# Patient Record
Sex: FEMALE | Race: WHITE | NOT HISPANIC OR LATINO | URBAN - METROPOLITAN AREA
[De-identification: names, ages, dates, MRNs, and addresses within clinical notes are randomized per-mention and may not be internally consistent; named-entity substitution may affect disease eponyms.]

---
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
## 2025-05-15 ENCOUNTER — TELEPHONE (OUTPATIENT)
Dept: NURSING | Facility: CLINIC | Age: 20
End: 2025-05-15
Payer: COMMERCIAL

## 2025-05-15 ENCOUNTER — HOSPITAL ENCOUNTER (EMERGENCY)
Facility: HOSPITAL | Age: 20
Discharge: HOME | End: 2025-05-15
Attending: STUDENT IN AN ORGANIZED HEALTH CARE EDUCATION/TRAINING PROGRAM
Payer: COMMERCIAL

## 2025-05-15 ENCOUNTER — NURSE TRIAGE (OUTPATIENT)
Dept: NURSING | Facility: CLINIC | Age: 20
End: 2025-05-15
Payer: COMMERCIAL

## 2025-05-15 VITALS
SYSTOLIC BLOOD PRESSURE: 99 MMHG | BODY MASS INDEX: 21.39 KG/M2 | OXYGEN SATURATION: 99 % | DIASTOLIC BLOOD PRESSURE: 65 MMHG | WEIGHT: 128.4 LBS | HEART RATE: 89 BPM | RESPIRATION RATE: 20 BRPM | HEIGHT: 65 IN | TEMPERATURE: 98.5 F

## 2025-05-15 DIAGNOSIS — K52.9 ACUTE GASTROENTERITIS: ICD-10-CM

## 2025-05-15 LAB
ALBUMIN SERPL BCG-MCNC: 4.3 G/DL (ref 3.5–5.2)
ALP SERPL-CCNC: 54 U/L (ref 40–150)
ALT SERPL W P-5'-P-CCNC: 12 U/L (ref 0–50)
ANION GAP SERPL CALCULATED.3IONS-SCNC: 7 MMOL/L (ref 7–15)
AST SERPL W P-5'-P-CCNC: 14 U/L (ref 0–45)
BASOPHILS # BLD AUTO: 0 10E3/UL (ref 0–0.2)
BASOPHILS NFR BLD AUTO: 0 %
BILIRUB DIRECT SERPL-MCNC: 0.12 MG/DL (ref 0–0.3)
BILIRUB SERPL-MCNC: 0.3 MG/DL
BUN SERPL-MCNC: 15.3 MG/DL (ref 6–20)
CALCIUM SERPL-MCNC: 8.7 MG/DL (ref 8.8–10.4)
CHLORIDE SERPL-SCNC: 106 MMOL/L (ref 98–107)
CREAT SERPL-MCNC: 0.7 MG/DL (ref 0.51–0.95)
EGFRCR SERPLBLD CKD-EPI 2021: >90 ML/MIN/1.73M2
EOSINOPHIL # BLD AUTO: 0 10E3/UL (ref 0–0.7)
EOSINOPHIL NFR BLD AUTO: 0 %
ERYTHROCYTE [DISTWIDTH] IN BLOOD BY AUTOMATED COUNT: 12 % (ref 10–15)
GLUCOSE SERPL-MCNC: 129 MG/DL (ref 70–99)
HCG UR QL: NEGATIVE
HCO3 SERPL-SCNC: 26 MMOL/L (ref 22–29)
HCT VFR BLD AUTO: 42 % (ref 35–47)
HGB BLD-MCNC: 14.2 G/DL (ref 11.7–15.7)
IMM GRANULOCYTES # BLD: 0 10E3/UL
IMM GRANULOCYTES NFR BLD: 0 %
LIPASE SERPL-CCNC: 34 U/L (ref 13–60)
LYMPHOCYTES # BLD AUTO: 1.2 10E3/UL (ref 0.8–5.3)
LYMPHOCYTES NFR BLD AUTO: 11 %
MCH RBC QN AUTO: 31.1 PG (ref 26.5–33)
MCHC RBC AUTO-ENTMCNC: 33.8 G/DL (ref 31.5–36.5)
MCV RBC AUTO: 92 FL (ref 78–100)
MONOCYTES # BLD AUTO: 0.6 10E3/UL (ref 0–1.3)
MONOCYTES NFR BLD AUTO: 5 %
NEUTROPHILS # BLD AUTO: 8.8 10E3/UL (ref 1.6–8.3)
NEUTROPHILS NFR BLD AUTO: 83 %
NRBC # BLD AUTO: 0 10E3/UL
NRBC BLD AUTO-RTO: 0 /100
PLATELET # BLD AUTO: 170 10E3/UL (ref 150–450)
POTASSIUM SERPL-SCNC: 3.6 MMOL/L (ref 3.4–5.3)
PROT SERPL-MCNC: 7 G/DL (ref 6.4–8.3)
RBC # BLD AUTO: 4.56 10E6/UL (ref 3.8–5.2)
SODIUM SERPL-SCNC: 139 MMOL/L (ref 135–145)
WBC # BLD AUTO: 10.6 10E3/UL (ref 4–11)

## 2025-05-15 PROCEDURE — 82248 BILIRUBIN DIRECT: CPT | Performed by: STUDENT IN AN ORGANIZED HEALTH CARE EDUCATION/TRAINING PROGRAM

## 2025-05-15 PROCEDURE — 85025 COMPLETE CBC W/AUTO DIFF WBC: CPT | Performed by: STUDENT IN AN ORGANIZED HEALTH CARE EDUCATION/TRAINING PROGRAM

## 2025-05-15 PROCEDURE — 83690 ASSAY OF LIPASE: CPT | Performed by: STUDENT IN AN ORGANIZED HEALTH CARE EDUCATION/TRAINING PROGRAM

## 2025-05-15 PROCEDURE — 258N000003 HC RX IP 258 OP 636: Performed by: STUDENT IN AN ORGANIZED HEALTH CARE EDUCATION/TRAINING PROGRAM

## 2025-05-15 PROCEDURE — 82947 ASSAY GLUCOSE BLOOD QUANT: CPT | Performed by: STUDENT IN AN ORGANIZED HEALTH CARE EDUCATION/TRAINING PROGRAM

## 2025-05-15 PROCEDURE — 99284 EMERGENCY DEPT VISIT MOD MDM: CPT | Mod: 25

## 2025-05-15 PROCEDURE — 250N000011 HC RX IP 250 OP 636: Performed by: STUDENT IN AN ORGANIZED HEALTH CARE EDUCATION/TRAINING PROGRAM

## 2025-05-15 PROCEDURE — 36415 COLL VENOUS BLD VENIPUNCTURE: CPT | Performed by: STUDENT IN AN ORGANIZED HEALTH CARE EDUCATION/TRAINING PROGRAM

## 2025-05-15 PROCEDURE — 81025 URINE PREGNANCY TEST: CPT | Performed by: STUDENT IN AN ORGANIZED HEALTH CARE EDUCATION/TRAINING PROGRAM

## 2025-05-15 PROCEDURE — 96361 HYDRATE IV INFUSION ADD-ON: CPT

## 2025-05-15 PROCEDURE — 96374 THER/PROPH/DIAG INJ IV PUSH: CPT

## 2025-05-15 RX ORDER — ONDANSETRON 2 MG/ML
4 INJECTION INTRAMUSCULAR; INTRAVENOUS ONCE
Status: COMPLETED | OUTPATIENT
Start: 2025-05-15 | End: 2025-05-15

## 2025-05-15 RX ADMIN — SODIUM CHLORIDE, SODIUM LACTATE, POTASSIUM CHLORIDE, AND CALCIUM CHLORIDE 1000 ML: .6; .31; .03; .02 INJECTION, SOLUTION INTRAVENOUS at 03:16

## 2025-05-15 RX ADMIN — ONDANSETRON 4 MG: 2 INJECTION, SOLUTION INTRAMUSCULAR; INTRAVENOUS at 03:17

## 2025-05-15 ASSESSMENT — ACTIVITIES OF DAILY LIVING (ADL)
ADLS_ACUITY_SCORE: 41
ADLS_ACUITY_SCORE: 41

## 2025-05-15 ASSESSMENT — COLUMBIA-SUICIDE SEVERITY RATING SCALE - C-SSRS
6. HAVE YOU EVER DONE ANYTHING, STARTED TO DO ANYTHING, OR PREPARED TO DO ANYTHING TO END YOUR LIFE?: NO
2. HAVE YOU ACTUALLY HAD ANY THOUGHTS OF KILLING YOURSELF IN THE PAST MONTH?: NO
1. IN THE PAST MONTH, HAVE YOU WISHED YOU WERE DEAD OR WISHED YOU COULD GO TO SLEEP AND NOT WAKE UP?: NO

## 2025-05-15 NOTE — DISCHARGE INSTRUCTIONS
With a combination of nausea and vomiting and now diarrhea I suspect you have a process called gastroenteritis.  This commonly can happen from food poisoning or sometimes viral infections.  Typically symptoms last a day or 2 and resolve over time on their own.  I would make sure you are staying well-hydrated.  If you develop recurrent intractable vomiting, severe and persistent abdominal pain, large amounts of bloody diarrhea or blood in your vomit you should return to the emergency department for repeat evaluation.

## 2025-05-15 NOTE — ED PROVIDER NOTES
EMERGENCY DEPARTMENT ENCOUNTER      NAME: Larissa Mcfarland  AGE: 20 year old female  YOB: 2005  MRN: [id number]  EVALUATION DATE & TIME: 5/15/2025  2:54 AM    PCP: Linh Manrique    ED PROVIDER: Ernesto Duenas MD      Chief Complaint   Patient presents with    Nausea, Vomiting, & Diarrhea    Abdominal Pain         FINAL IMPRESSION:  1. Acute gastroenteritis          ED COURSE & MEDICAL DECISION MAKING:    Pertinent Labs & Imaging studies reviewed. (See chart for details)  20 year old female presents to the Emergency Department for evaluation of nausea and vomiting, abdominal pain    ED Course as of 05/15/25 0435   Thu May 15, 2025   0307 Patient is a 20-year-old female without significant past medical history presenting to the emergency department for evaluation of abrupt onset nausea and vomiting that started about an hour ago that woke her up from sleep.  Has associated abdominal pain particularly epigastrium as well.  No recent fevers.  No diarrhea.  No blood in the stools or vomit.  Denies any vaginal bleeding or abnormal vaginal discharge.  No urinary symptoms.  No prior abdominal surgeries.  Mother states that has a family history of Brugada gene.    Exam patient is repeatedly retching during history and exam.  Minimal epigastric abdominal tenderness but no guarding or rebound.  Borderline tachycardic, warm and well-perfused extremities.  Emesis in the bucket she has in the room with her is nonbloody and nonbilious.    Suspect probably gastritis or foodborne intoxication/food poisoning.  Will obtain blood work to evaluate for significant metabolic derangements and attempt symptomatic control with IV Zofran and fluids.   0434 Labs reviewed interpreted myself.  No significant leukocytosis or anemia.  Reassuring electrolytes.  No LFT abnormalities.  Urine pregnancy is negative.      Patient did have a loose stool while here in the emergency department although it was nonbloody.  Suspect probable  gastroenteritis perhaps a foodborne etiology or viral etiology.    Upon repeat evaluation patient is nausea improved and is now tolerating oral intake.  Offered a course of Zofran to take at home but patient declined.  Otherwise discussed signs and symptoms of worsening condition.     3:00 AM I met and evaluated the patient.         Medical Decision Making  I obtained history from Family Member/Significant Other  Discharge. I recommended prescription strength medication(s) Zofran, but patient declined  . I considered admission, but discharged patient after significant clinical improvement.    MIPS (CTPE, Dental pain, Ricci, Sinusitis, Asthma/COPD, Head Trauma): Not Applicable    SEPSIS: None           At the conclusion of the encounter I discussed the results of all of the tests and the disposition. The questions were answered. The patient or family acknowledged understanding and was agreeable with the care plan.     0 minutes of critical care time     MEDICATIONS GIVEN IN THE EMERGENCY:  Medications   lactated ringers BOLUS 1,000 mL (0 mLs Intravenous Stopped 5/15/25 1926)   ondansetron (ZOFRAN) injection 4 mg (4 mg Intravenous $Given 5/15/25 4466)       NEW PRESCRIPTIONS STARTED AT TODAY'S ER VISIT  New Prescriptions    No medications on file          =================================================================    HPI    Patient information was obtained from: Patient, patient's mother    Use of : N/A       Larissa Mcfarland is a 20 year old otherwise healthy female who presents to this ED by walk-in for evaluation of nausea, vomiting, and epigastric pain.     Per patient: Patient woke up ~1 hour prior to arrival with epigastric pain, nausea, and vomiting; she was not experiencing any of these symptoms when she went to bed last night. She denies having had symptoms similar to these previously. No medications prior to arrival.     Patient denies fever, sore throat, cough, urinary symptoms, or any  "other symptoms at this time. No new or unusual foods.No history of abdominal surgery.      Per patient's mother: When she was much younger, the patient had \"excruciating\" abdominal pain with constipation. No known allergies besides artificial red food dyes. Of note, the patient's mother is a carrier for Brugada syndrome; she is unsure whether the patient has the syndrome herself.      REVIEW OF SYSTEMS   Refer to the [facility]    PAST MEDICAL HISTORY:  No past medical history on file.    PAST SURGICAL HISTORY:  Past Surgical History:   Procedure Laterality Date    NO PAST SURGERIES             CURRENT MEDICATIONS:    No current outpatient medications on file.      ALLERGIES:  Allergies   Allergen Reactions    Red Dye #40 (Allura Red) Unknown       FAMILY HISTORY:  No family history on file.    SOCIAL HISTORY:   Social History     Socioeconomic History    Marital status: Single   Tobacco Use    Smoking status: Never     Passive exposure: Yes    Smokeless tobacco: Never       VITALS:  /77   Pulse 106   Temp 98.5  F (36.9  C) (Oral)   Resp 20   Ht 1.651 m (5' 5\")   Wt 58.2 kg (128 lb 6.4 oz)   SpO2 99%   BMI 21.37 kg/m      PHYSICAL EXAM    Constitutional: Well developed, Well nourished, NAD.  HENT: Normocephalic, Atraumatic,  mucous membranes moist,   Neck- trachea midline, No stridor.    Eyes:EOMI, Conjunctiva normal, No discharge.   Respiratory: Normal breath sounds, No respiratory distress, No wheezing.    Cardiovascular: Normal heart rate, Regular rhythm,  No murmurs.   Abdominal: Soft, Epigastric tenderness, No rebound or guarding.     Musculoskeletal: no deformity or malalignment.  Integument: Warm, Dry, No erythema.  Neurologic: Alert & oriented x 3.  Psychiatric: Affect normal, Cooperative.      LAB:  All pertinent labs reviewed and interpreted.  Results for orders placed or performed during the hospital encounter of 05/15/25   HCG qualitative urine   Result Value Ref Range    hCG Urine Qualitative " Negative Negative   Basic metabolic panel   Result Value Ref Range    Sodium 139 135 - 145 mmol/L    Potassium 3.6 3.4 - 5.3 mmol/L    Chloride 106 98 - 107 mmol/L    Carbon Dioxide (CO2) 26 22 - 29 mmol/L    Anion Gap 7 7 - 15 mmol/L    Urea Nitrogen 15.3 6.0 - 20.0 mg/dL    Creatinine 0.70 0.51 - 0.95 mg/dL    GFR Estimate >90 >60 mL/min/1.73m2    Calcium 8.7 (L) 8.8 - 10.4 mg/dL    Glucose 129 (H) 70 - 99 mg/dL   Result Value Ref Range    Lipase 34 13 - 60 U/L   Hepatic function panel   Result Value Ref Range    Protein Total 7.0 6.4 - 8.3 g/dL    Albumin 4.3 3.5 - 5.2 g/dL    Bilirubin Total 0.3 <=1.2 mg/dL    Alkaline Phosphatase 54 40 - 150 U/L    AST 14 0 - 45 U/L    ALT 12 0 - 50 U/L    Bilirubin Direct 0.12 0.00 - 0.30 mg/dL   CBC with platelets and differential   Result Value Ref Range    WBC Count 10.6 4.0 - 11.0 10e3/uL    RBC Count 4.56 3.80 - 5.20 10e6/uL    Hemoglobin 14.2 11.7 - 15.7 g/dL    Hematocrit 42.0 35.0 - 47.0 %    MCV 92 78 - 100 fL    MCH 31.1 26.5 - 33.0 pg    MCHC 33.8 31.5 - 36.5 g/dL    RDW 12.0 10.0 - 15.0 %    Platelet Count 170 150 - 450 10e3/uL    % Neutrophils 83 %    % Lymphocytes 11 %    % Monocytes 5 %    % Eosinophils 0 %    % Basophils 0 %    % Immature Granulocytes 0 %    NRBCs per 100 WBC 0 <1 /100    Absolute Neutrophils 8.8 (H) 1.6 - 8.3 10e3/uL    Absolute Lymphocytes 1.2 0.8 - 5.3 10e3/uL    Absolute Monocytes 0.6 0.0 - 1.3 10e3/uL    Absolute Eosinophils 0.0 0.0 - 0.7 10e3/uL    Absolute Basophils 0.0 0.0 - 0.2 10e3/uL    Absolute Immature Granulocytes 0.0 <=0.4 10e3/uL    Absolute NRBCs 0.0 10e3/uL           PROCEDURES:   None      [facility] Documentation:   CMS Diagnoses:              I, Keegan Woodall, am serving as a scribe to document services personally performed by Ernesto Duenas MD based on my observation and the provider's statements to me. I, Ernesto Duenas MD, attest that Keegan Woodall is acting in a scribe capacity, has observed my performance of the  services and has documented them in accordance with my direction.    Ernesto Duenas MD  [facility] EMERGENCY DEPARTMENT  [facility] [facility] [address] [phone]       Ernesto Duenas MD  05/15/25 0433

## 2025-05-15 NOTE — TELEPHONE ENCOUNTER
Call from patient mother requesting that writer confirm the possibility that patient may have food poisoning based on the fact that she is having stomach pains and vomiting.     Writer advised caller that there is no consent on file for writer to speak with caller regarding PHI and that writer could not confirm that the patient has food poisoning. Writer advised that a triage would need to be completed to provide best advice based on sx. And advised caller to call back to the triage line once she was in the presence of the patient.

## 2025-05-15 NOTE — ED NOTES
Patrient's mother requested information sheet on Zorfan. Provided Patient education from Up-To-Dated.  Ondansetron: Patient drug information

## 2025-05-15 NOTE — TELEPHONE ENCOUNTER
"Call from patient in company of her mother patient provided consent at this time to speak with mother jay.             Nurse Triage SBAR    Is this a 2nd Level Triage? NO    Situation: stomach pain    Background: Patient reports that she has vomited 4x. Patient denies blood in the emesis. Rates pain 8/10. Patient denies recent alcohol consumption of concerning food intake. Patient states that pain has been present for an hour. She denies taking any medciations for pain.     Assessment: Patient reports that she has vomited 4x. Patient denies blood in the emesis. Rates pain 8/10. Patient denies recent alcohol consumption of concerning food intake. Patient states that pain has been present for an hour. She denies taking any medciations for pain.     Protocol Recommended Disposition:   Go to ED Now, See More Appropriate Guideline    During call patient mother was actively enroute to [facility] ED.     Recommendation:  care advice given          Does the patient meet one of the following criteria for ADS visit consideration? 16+ years old, with an [facility] PCP     TIP  Providers, please consider if this condition is appropriate for management at one of our Acute and Diagnostic Services sites.     If patient is a good candidate, please use dotphrase <dot>triageresponse and select Refer to ADS to document.        Reason for Disposition   Pain is mainly in upper abdomen  (if needed ask: \"is it mainly above the belly button?\")   [1] SEVERE pain (e.g., excruciating) AND [2] present > 1 hour    Additional Information   Negative: Shock suspected (e.g., cold/pale/clammy skin, too weak to stand, low BP, rapid pulse)   Negative: Difficult to awaken or acting confused (e.g., disoriented, slurred speech)   Negative: Passed out (i.e., lost consciousness, collapsed and was not responding)   Negative: Sounds like a life-threatening emergency to the triager   Negative: Followed an abdomen (stomach) injury   Negative: Chest pain   Negative: " [1] Abdominal pain AND [2] pregnant < 20 weeks   Negative: [1] Abdominal pain AND [2] pregnant 20 or more weeks   Negative: [1] Abdominal pain AND [2] postpartum (from 0 to 6 weeks after delivery)   Negative: SEVERE difficulty breathing (e.g., struggling for each breath, speaks in single words)   Negative: Shock suspected (e.g., cold/pale/clammy skin, too weak to stand, low BP, rapid pulse)   Negative: Difficult to awaken or acting confused (e.g., disoriented, slurred speech)   Negative: Passed out (i.e., lost consciousness, collapsed and was not responding)   Negative: Visible sweat on face or sweat dripping down face   Negative: Sounds like a life-threatening emergency to the triager   Negative: Followed an abdomen (stomach) injury   Negative: Chest pain   Negative: [1] Abdominal pain AND [2] pregnant < 20 weeks   Negative: [1] Abdominal pain AND [2] pregnant 20 or more weeks   Negative: [1] Abdominal pain AND [2] postpartum (from 0 to 6 weeks after delivery)   Negative: Abdomen bloating or swelling are main symptoms    Protocols used: Abdominal Pain - Female-A-AH, Abdominal Pain - Upper-A-AH

## 2025-05-15 NOTE — ED TRIAGE NOTES
Pt. Woke up with severe abdominal pain, nausea and vomiting about an hour ago. No blood noted.     Triage Assessment (Adult)       Row Name 05/15/25 0252          Triage Assessment    Airway WDL WDL        Respiratory WDL    Respiratory WDL WDL        Skin Circulation/Temperature WDL    Skin Circulation/Temperature WDL WDL        Cardiac WDL    Cardiac WDL WDL        Peripheral/Neurovascular WDL    Peripheral Neurovascular WDL WDL        Cognitive/Neuro/Behavioral WDL    Cognitive/Neuro/Behavioral WDL WDL